# Patient Record
Sex: MALE | Race: WHITE | URBAN - METROPOLITAN AREA
[De-identification: names, ages, dates, MRNs, and addresses within clinical notes are randomized per-mention and may not be internally consistent; named-entity substitution may affect disease eponyms.]

---

## 2021-06-15 LAB
ABS NEUT (OLG): 3.9 X10(3)/MCL (ref 2.1–9.2)
BASOPHILS # BLD AUTO: 0 X10(3)/MCL (ref 0–0.2)
BASOPHILS NFR BLD AUTO: 0 %
BUN SERPL-MCNC: 19.7 MG/DL (ref 8.4–25.7)
CALCIUM SERPL-MCNC: 9 MG/DL (ref 8.8–10)
CHLORIDE SERPL-SCNC: 105 MMOL/L (ref 98–107)
CO2 SERPL-SCNC: 24 MMOL/L (ref 23–31)
CREAT SERPL-MCNC: 1.09 MG/DL (ref 0.73–1.18)
CREAT/UREA NIT SERPL: 18
EOSINOPHIL # BLD AUTO: 0.2 X10(3)/MCL (ref 0–0.9)
EOSINOPHIL NFR BLD AUTO: 3 %
ERYTHROCYTE [DISTWIDTH] IN BLOOD BY AUTOMATED COUNT: 13.4 % (ref 11.5–17)
GLUCOSE SERPL-MCNC: 118 MG/DL (ref 82–115)
HCT VFR BLD AUTO: 43.5 % (ref 42–52)
HGB BLD-MCNC: 14.6 GM/DL (ref 14–18)
LYMPHOCYTES # BLD AUTO: 1.7 X10(3)/MCL (ref 0.6–4.6)
LYMPHOCYTES NFR BLD AUTO: 28 %
MCH RBC QN AUTO: 30.2 PG (ref 27–31)
MCHC RBC AUTO-ENTMCNC: 33.6 GM/DL (ref 33–36)
MCV RBC AUTO: 89.9 FL (ref 80–94)
MONOCYTES # BLD AUTO: 0.4 X10(3)/MCL (ref 0.1–1.3)
MONOCYTES NFR BLD AUTO: 7 %
NEUTROPHILS # BLD AUTO: 3.9 X10(3)/MCL (ref 2.1–9.2)
NEUTROPHILS NFR BLD AUTO: 63 %
PLATELET # BLD AUTO: 172 X10(3)/MCL (ref 130–400)
PMV BLD AUTO: 11.8 FL (ref 9.4–12.4)
POTASSIUM SERPL-SCNC: 4.2 MMOL/L (ref 3.5–5.1)
RBC # BLD AUTO: 4.84 X10(6)/MCL (ref 4.7–6.1)
SODIUM SERPL-SCNC: 137 MMOL/L (ref 136–145)
WBC # SPEC AUTO: 6.2 X10(3)/MCL (ref 4.5–11.5)

## 2021-06-18 ENCOUNTER — HISTORICAL (OUTPATIENT)
Dept: SURGERY | Facility: HOSPITAL | Age: 61
End: 2021-06-18

## 2021-06-18 LAB — EST CREAT CLEARANCE SER (OHS): 95.73 ML/MIN

## 2022-04-30 NOTE — H&P
"   Patient:   Kulwant Grimes            MRN: [id number]            FIN: [id number]               Age:   61 years     Sex:  Male     :  1960   Associated Diagnoses:   IH (inguinal hernia)   Author:   Kesha Ragsdale      Basic Information   Source of history:  Self.    History limitation:  None.       Chief Complaint   " I am having my right inguinal hernia repaired"      History of Present Illness   Mr. Grimes is a 60 yo male that presents to [facility] for elective inguinal hernia repair. Remote hx of open bilateral inguinal hernia repair. Presents with recurrent RIH. No new complaints. Ready to proceed.       Review of Systems   Constitutional:  Negative.    Eye:  Negative.    Ear/Nose/Mouth/Throat:  Negative.    Respiratory:  Negative.    Cardiovascular:  Negative.    Gastrointestinal:  Negative.    Genitourinary:  + right groin pain and bulge.    Hematology/Lymphatics:  Negative.    Endocrine:  Negative.    Immunologic:  Negative.    Musculoskeletal:  Negative.    Integumentary:  Negative.    Neurologic:  Negative.    Psychiatric:  Negative.       Health Status   Allergies:    Allergic Reactions (Selected)  No Known Medication Allergies   Current medications:  (Selected)   Inpatient Medications  Ordered  Ancef: 1 gm, form: Infusion Surgical prophylaxis, IV Piggyback, On Call, first dose 21 6:57:00 CDT  Lactated Ringers Injection intravenous solution 1,000 mL: 1,000 mL, 1,000 mL, IV, 75 mL/hr, start date 21 6:57:00 CDT, 2.06, m2  Zofran 4 mg oral tablet: 4 mg, form: Tab, Oral, Pre Op, first dose 21 7:57:00 CDT, stop date 21 7:57:00 CDT, give one dose 1 hour pre op  Pending Complete  midazolam: 2 mg, form: Injection, IV Push, q5min, Order duration: 2 dose(s), first dose 21 7:00:00 CDT, stop date 21 7:09:00 CDT, (up to 5 mg for moderate anxiety)   Problem list:    All Problems  Kidney stone / SNOMED CT 149718049 / Confirmed  Enlarged prostate / SNOMED CT 743646764 / " Confirmed  Transurethral resection of prostate / SNOMED CT 525603296 / Confirmed      Histories   Past Medical History:    No active or resolved past medical history items have been selected or recorded.   Family History:    Primary malignant neoplasm of colon  Father  Pancreatic cancer  Mother     Procedure history:    Exploration Laparotomy performed by Marija PEREZ, Chen Mcdonough on 5/6/2018 at 58 Years.  Comments:  5/6/2018 2:18 CDT - Fiorella MCCLURE, Williams KENDALL  auto-populated from documented surgical case  Hernia repair (SNOMED CT 95553327).  Comments:  10/17/2017 20:29 CDT - Bonifacio Loera RN  groin  Hernia repair (SNOMED CT 82680627).  Comments:  6/15/2021 10:37 CDT - Jaycee MCCLURE, Carolin Watkins  bilateral inguinal hernia 30 years ago  resection of prostate.   Social History        Social & Psychosocial Habits    Alcohol  06/18/2021  Use: Past    Comment: AMANDO. - 02/17/2020 08:39 - Elizabeth Urbina RN    Employment/School  06/18/2021  Status: Employed    Description:     Highest education: Some college    Nutrition/Health  02/17/2020  Home Diet Regular    Substance Use  06/18/2021  Use: Current    Type: Marijuana    Frequency: 1-2 times per week    Tobacco  06/15/2021  Use: Never (less than 100 in l    Patient Wants Consult For Cessation Counseling N/A    06/18/2021  Use: Never (less than 100 in l    Patient Wants Consult For Cessation Counseling No    Abuse/Neglect  06/25/2020  SHX Any signs of abuse or neglect No    06/18/2021  SHX Any signs of abuse or neglect No    Feels unsafe at home: No    Safe place to go: Yes    Spiritual/Cultural  06/15/2021  [employer]  .        Physical Examination   Vital Signs   6/18/2021 7:30 CDT       Oxygen Therapy            Room air    6/18/2021 7:00 CDT       Temperature Oral          37.1 DegC                             Temperature Oral (calculated)             98.78 DegF                             Heart Rate Monitored      95 bpm                              Respiratory Rate          20 br/min                             SpO2                      100 %                             Systolic Blood Pressure   151 mmHg  HI                             Diastolic Blood Pressure  91 mmHg  HI                             Blood Pressure Location   Left arm                             Blood Pressure Cuff Size  Adult long     Measurements from flowsheet : Measurements   6/18/2021 7:30 CDT       Weight Dosing             95.1 kg                             Weight Measured           95.1 kg                             Weight Measured and Calculated in Lbs     209.66 lb                             Height/Length Dosing      191 cm                             Height/Length Measured    191 cm                             Body Mass Index Measured  26.07 kg/m2     General:  Alert and oriented, No acute distress.    HENT:  Normocephalic.    Neck:  Supple.    Respiratory:  Lungs are clear to auscultation, Respirations are non-labored.    Cardiovascular:  Normal rate, Regular rhythm.    Gastrointestinal:  Soft,      Abdomen: Right, Inguinal hernia.    Musculoskeletal:  Normal range of motion.    Integumentary:  Warm, Dry.    Neurologic:  Alert, Oriented.    Psychiatric:  Cooperative, Appropriate mood & affect.       Impression and Plan   Diagnosis     IH (inguinal hernia) ([id number]-AO K40.90).     Education and Follow-up:       Counseled: Patient, Regarding diagnosis, Regarding treatment.    Laparoscopic right inguinal hernia repair with mesh, and other indicated procedures  Possible need to convert to open due to previous open surgery.  Dr. Kay examined patient, obtained informed consent, and answered all questions.

## 2022-04-30 NOTE — OP NOTE
Patient:   Kulwant Grimes            MRN: [id number]            FIN: [id number]               Age:   61 years     Sex:  Male     :  1960   Associated Diagnoses:   Right inguinal hernia   Author:   Brain Kay MD      Operative Note   Operative Information   Procedures Performed: Laparoscopic Right Inguinal Hernia Repair, TEPP.     Preoperative Diagnosis: Right inguinal hernia ([id number] K40.90).     Postoperative Diagnosis: Right inguinal hernia ([id number] K40.90).     Surgeon: Brian Kay MD.     Assistant: Kesha Ragsdale.     Anesthesia: GETA.     Description of Procedure/Findings/    Complications: After informed consent, the patient was brought to the OR.  He was placed on the table in the supine position, and given general anesthesia.    The abdomen was prepped and draped.  The bladder was emptied.  An infraumbilicial incision was made into the preperitoneal space.  A peritoneal dissection balloon was placed and insufflated under direct vision.  The SBT replaced this device and pneumopreperitoneum was established.  Additional working ports placed under direct vision.  He had a large indirect hernia.  The contents were reduced.  Appropriate dissection revealed the appropriate structures (Ileopubic tract, Iliac Vessels, Cord structures, Supa's ligament, Inferior Epigastric Vessels) There was no injury to any of these structures.  Large right 3D Max mesh placed up against the myopectineal orifice.  This completed the repair.     There was no left sided hernia.  Trocars removed.  Incisions closed with 0 and 4-0 Vicryl.  The patient was allowed to emerge from anesthetic and brought to recovery room.  .     Esimated blood loss: loss less than  25  cc.     Complications.